# Patient Record
Sex: MALE | Race: WHITE | HISPANIC OR LATINO | Employment: OTHER | URBAN - METROPOLITAN AREA
[De-identification: names, ages, dates, MRNs, and addresses within clinical notes are randomized per-mention and may not be internally consistent; named-entity substitution may affect disease eponyms.]

---

## 2017-09-20 NOTE — PATIENT DISCUSSION
The patient has occludable angles. Laser peripheral iridotomy is recommended. The risks, benefits, and alternatives to surgery were discussed. The patient elects to proceed with surgery.

## 2017-09-20 NOTE — PATIENT DISCUSSION
Advised patient to avoid certain medications such as antihistamine/decongestant drops, cold medications and to read labels on over the counter and prescription medications in which it may cause an acute angle-closure glaucoma attack until procedures are done in both eyes.

## 2018-03-14 NOTE — PATIENT DISCUSSION
Discussed importance of taking drops consistently. Cost is an issue with branded medication. $40 vs $4. Patient being affected by Dorz/Too shortage. Running out of medication options. Was not tolerating Combigan. Needs to stay on Dorz/Too.

## 2018-08-01 ENCOUNTER — PREPPED CHART (OUTPATIENT)
Dept: URBAN - METROPOLITAN AREA CLINIC 25 | Facility: CLINIC | Age: 33
End: 2018-08-01

## 2021-01-11 NOTE — PATIENT DISCUSSION
DISCUSSED WITH PT THAT IT HAS BEEN REPORTED THAT PATIENTS WITH HISTORY OF DEPRESSION OR MENTAL ILLNESS FOLLOWING ELECTIVE PROCEDURES, SUCH AS PLASTIC SURGERY OR LASER VISION CORRECTION CAN ADVERSELY EFFECT THEIR QUALITY OF LIFE, REGARDLESS WHETHER OUTCOME IS GOOD OR SUB-OPTIMAL PT ADVISED OF THE ALTERNATIVES OF STAYING WITH GLASSES AND/OR CONTACTS IN LIEU OF LASER VISION CORRECTION. PT. UNDERSTANDS THE RISK AND WISH TO PROCEED WITH PROCEDURE.

## 2021-01-11 NOTE — PATIENT DISCUSSION
New Prescription: gatifloxacin (gatifloxacin): drops: 0.5% 1 drop four times a day into both eyes 01-

## 2021-01-11 NOTE — PATIENT DISCUSSION
New Prescription: prednisolone acetate (prednisolone acetate): drops,suspension: 1% 1 drop four times a day into both eyes 01-

## 2021-01-28 NOTE — PATIENT DISCUSSION
Continue: prednisolone acetate (prednisolone acetate): drops,suspension: 1% 1 drop four times a day into both eyes 01-

## 2021-01-28 NOTE — PATIENT DISCUSSION
MYOPIA, OU- DISC OPT OF REFRACTIVE [id number]. PT UNDERSTANDS OPTIONS AND DESIRES TO PROCEED WITH LASIK TO REDUCE DEPENDENCY ON GLS/CTLS.

## 2021-10-28 ASSESSMENT — TONOMETRY
OD_IOP_MMHG: 9
OS_IOP_MMHG: 10

## 2021-11-01 ENCOUNTER — NEW PATIENT COMPREHENSIVE (OUTPATIENT)
Dept: URBAN - METROPOLITAN AREA CLINIC 25 | Facility: CLINIC | Age: 36
End: 2021-11-01

## 2021-11-01 DIAGNOSIS — H52.13: ICD-10-CM

## 2021-11-01 DIAGNOSIS — H52.223: ICD-10-CM

## 2021-11-01 DIAGNOSIS — H18.603: ICD-10-CM

## 2021-11-01 PROCEDURE — 92004 COMPRE OPH EXAM NEW PT 1/>: CPT

## 2021-11-01 PROCEDURE — 92015 DETERMINE REFRACTIVE STATE: CPT

## 2021-11-01 PROCEDURE — 92025 CPTRIZED CORNEAL TOPOGRAPHY: CPT

## 2021-11-01 ASSESSMENT — KERATOMETRY
OS_AXISANGLE_DEGREES: 167
OS_AXISANGLE2_DEGREES: 77
OD_K2POWER_DIOPTERS: 45.75
OS_K1POWER_DIOPTERS: 42.50
OD_K1POWER_DIOPTERS: 43.75
OD_AXISANGLE2_DEGREES: 104
OS_K2POWER_DIOPTERS: 44.00
OD_AXISANGLE_DEGREES: 14

## 2021-11-01 ASSESSMENT — VISUAL ACUITY
OS_SC: 20/20
OD_SC: 20/40

## 2021-11-01 ASSESSMENT — TONOMETRY
OS_IOP_MMHG: 10
OD_IOP_MMHG: 8

## 2022-07-26 ENCOUNTER — PREPPED CHART (OUTPATIENT)
Dept: URBAN - METROPOLITAN AREA CLINIC 29 | Facility: CLINIC | Age: 37
End: 2022-07-26

## 2022-07-26 ASSESSMENT — KERATOMETRY
OS_AXISANGLE2_DEGREES: 77
OS_AXISANGLE_DEGREES: 167
OS_K2POWER_DIOPTERS: 44.00
OD_AXISANGLE2_DEGREES: 104
OD_AXISANGLE_DEGREES: 14
OD_K2POWER_DIOPTERS: 45.75
OS_K1POWER_DIOPTERS: 42.50
OD_K1POWER_DIOPTERS: 43.75

## 2022-07-29 ENCOUNTER — CONSULTATION/EVALUATION (OUTPATIENT)
Dept: URBAN - METROPOLITAN AREA CLINIC 29 | Facility: CLINIC | Age: 37
End: 2022-07-29

## 2022-07-29 DIAGNOSIS — H52.13: ICD-10-CM

## 2022-07-29 DIAGNOSIS — H18.629: ICD-10-CM

## 2022-07-29 PROCEDURE — 92025 CPTRIZED CORNEAL TOPOGRAPHY: CPT

## 2022-07-29 PROCEDURE — 99203 OFFICE O/P NEW LOW 30 MIN: CPT

## 2022-07-29 ASSESSMENT — KERATOMETRY
OD_K1POWER_DIOPTERS: 43.75
OS_K1POWER_DIOPTERS: 42.50
OS_AXISANGLE2_DEGREES: 77
OS_K2POWER_DIOPTERS: 44.00
OS_AXISANGLE_DEGREES: 167
OD_AXISANGLE2_DEGREES: 104
OD_AXISANGLE_DEGREES: 14
OD_K2POWER_DIOPTERS: 45.75

## 2022-07-29 ASSESSMENT — VISUAL ACUITY
OD_CC: 20/25-2
OS_SC: 20/25+2
OS_CC: 20/20
OD_PH: 20/20-2
OD_SC: 20/40-2

## 2022-07-29 ASSESSMENT — TONOMETRY
OS_IOP_MMHG: 13
OD_IOP_MMHG: 12

## 2022-12-07 ENCOUNTER — CLINIC PROCEDURE ONLY (OUTPATIENT)
Dept: URBAN - METROPOLITAN AREA CLINIC 29 | Facility: CLINIC | Age: 37
End: 2022-12-07

## 2022-12-07 VITALS — HEIGHT: 60 IN | SYSTOLIC BLOOD PRESSURE: 140 MMHG | DIASTOLIC BLOOD PRESSURE: 80 MMHG

## 2022-12-07 PROCEDURE — 0402T COLGN CRS-LINK CRN&PACHYMTRY: CPT

## 2022-12-07 PROCEDURE — 92071 CONTACT LENS FITTING FOR TX: CPT

## 2022-12-07 RX ORDER — BROMFENAC SODIUM 0.7 MG/ML: 1 SOLUTION/ DROPS OPHTHALMIC ONCE A DAY

## 2022-12-07 ASSESSMENT — KERATOMETRY
OS_K1POWER_DIOPTERS: 42.50
OD_K2POWER_DIOPTERS: 45.75
OS_AXISANGLE2_DEGREES: 77
OD_K1POWER_DIOPTERS: 43.75
OS_K2POWER_DIOPTERS: 44.00
OD_AXISANGLE2_DEGREES: 104
OS_AXISANGLE_DEGREES: 167
OD_AXISANGLE_DEGREES: 14

## 2022-12-12 ENCOUNTER — POST-OP (OUTPATIENT)
Dept: URBAN - METROPOLITAN AREA CLINIC 29 | Facility: CLINIC | Age: 37
End: 2022-12-12

## 2022-12-12 PROCEDURE — 66999PO NON CO-MANAGED OTHER SURGERY PO

## 2022-12-12 ASSESSMENT — KERATOMETRY
OD_AXISANGLE_DEGREES: 14
OD_K1POWER_DIOPTERS: 43.75
OS_AXISANGLE_DEGREES: 167
OS_K1POWER_DIOPTERS: 42.50
OS_K2POWER_DIOPTERS: 44.00
OD_K2POWER_DIOPTERS: 45.75
OS_AXISANGLE2_DEGREES: 77
OD_AXISANGLE2_DEGREES: 104

## 2022-12-12 ASSESSMENT — VISUAL ACUITY: OS_SC: 20/25-2

## 2023-01-10 ENCOUNTER — POST-OP (OUTPATIENT)
Dept: URBAN - METROPOLITAN AREA CLINIC 29 | Facility: CLINIC | Age: 38
End: 2023-01-10

## 2023-01-10 DIAGNOSIS — Z98.890: ICD-10-CM

## 2023-01-10 DIAGNOSIS — H18.622: ICD-10-CM

## 2023-01-10 PROCEDURE — 92025 CPTRIZED CORNEAL TOPOGRAPHY: CPT

## 2023-01-10 PROCEDURE — 66999PO NON CO-MANAGED OTHER SURGERY PO

## 2023-01-10 ASSESSMENT — KERATOMETRY
OS_AXISANGLE_DEGREES: 167
OD_K2POWER_DIOPTERS: 45.75
OD_AXISANGLE_DEGREES: 14
OS_K2POWER_DIOPTERS: 44.00
OS_K1POWER_DIOPTERS: 42.50
OD_AXISANGLE2_DEGREES: 104
OS_AXISANGLE2_DEGREES: 77
OD_K1POWER_DIOPTERS: 43.75

## 2023-01-10 ASSESSMENT — TONOMETRY
OS_IOP_MMHG: 12
OD_IOP_MMHG: 13

## 2023-01-10 ASSESSMENT — VISUAL ACUITY
OD_SC: 20/40-2
OS_SC: 20/25

## 2023-04-28 ENCOUNTER — POST-OP (OUTPATIENT)
Dept: URBAN - METROPOLITAN AREA CLINIC 29 | Facility: CLINIC | Age: 38
End: 2023-04-28

## 2023-04-28 DIAGNOSIS — H18.613: ICD-10-CM

## 2023-04-28 PROCEDURE — 99213 OFFICE O/P EST LOW 20 MIN: CPT

## 2023-04-28 PROCEDURE — 92025 CPTRIZED CORNEAL TOPOGRAPHY: CPT

## 2023-04-28 ASSESSMENT — KERATOMETRY
OS_AXISANGLE_DEGREES: 167
OD_AXISANGLE2_DEGREES: 104
OS_K1POWER_DIOPTERS: 42.50
OS_AXISANGLE2_DEGREES: 77
OD_AXISANGLE_DEGREES: 14
OD_K2POWER_DIOPTERS: 45.75
OS_K2POWER_DIOPTERS: 44.00
OD_K1POWER_DIOPTERS: 43.75

## 2023-04-28 ASSESSMENT — VISUAL ACUITY
OS_SC: 20/20
OD_SC: 20/30+2

## 2023-04-28 ASSESSMENT — TONOMETRY
OS_IOP_MMHG: 13
OD_IOP_MMHG: 12

## 2023-10-20 ENCOUNTER — FOLLOW UP (OUTPATIENT)
Dept: URBAN - METROPOLITAN AREA CLINIC 29 | Facility: CLINIC | Age: 38
End: 2023-10-20

## 2023-10-20 DIAGNOSIS — H18.613: ICD-10-CM

## 2023-10-20 DIAGNOSIS — Z98.890: ICD-10-CM

## 2023-10-20 PROCEDURE — 92012 INTRM OPH EXAM EST PATIENT: CPT

## 2023-10-20 PROCEDURE — 92025 CPTRIZED CORNEAL TOPOGRAPHY: CPT

## 2023-10-20 ASSESSMENT — KERATOMETRY
OD_K1POWER_DIOPTERS: 43.75
OD_AXISANGLE2_DEGREES: 104
OS_AXISANGLE2_DEGREES: 77
OS_K1POWER_DIOPTERS: 42.50
OD_AXISANGLE_DEGREES: 14
OS_K2POWER_DIOPTERS: 44.00
OD_K2POWER_DIOPTERS: 45.75
OS_AXISANGLE_DEGREES: 167

## 2023-10-20 ASSESSMENT — VISUAL ACUITY
OD_SC: 20/30-2
OS_SC: 20/20-2

## 2023-10-20 ASSESSMENT — TONOMETRY
OD_IOP_MMHG: 12
OS_IOP_MMHG: 12

## 2024-08-16 ENCOUNTER — COMPREHENSIVE EXAM (OUTPATIENT)
Dept: URBAN - METROPOLITAN AREA CLINIC 25 | Facility: CLINIC | Age: 39
End: 2024-08-16

## 2024-08-16 DIAGNOSIS — H52.223: ICD-10-CM

## 2024-08-16 PROCEDURE — 92014 COMPRE OPH EXAM EST PT 1/>: CPT

## 2024-08-16 PROCEDURE — 92015 DETERMINE REFRACTIVE STATE: CPT

## 2024-08-16 ASSESSMENT — VISUAL ACUITY
OD_SC: 20/20-1
OS_SC: 20/20

## 2024-08-16 ASSESSMENT — KERATOMETRY
OS_AXISANGLE_DEGREES: 168
OS_K2POWER_DIOPTERS: 44.00
OD_AXISANGLE_DEGREES: 14
OD_K1POWER_DIOPTERS: 43.75
OD_AXISANGLE2_DEGREES: 104
OD_K1POWER_DIOPTERS: 43.50
OS_AXISANGLE2_DEGREES: 77
OD_K2POWER_DIOPTERS: 45.75
OS_AXISANGLE_DEGREES: 167
OS_K1POWER_DIOPTERS: 42.50
OD_K2POWER_DIOPTERS: 45.50
OS_AXISANGLE2_DEGREES: 78
OD_AXISANGLE_DEGREES: 12
OD_AXISANGLE2_DEGREES: 102

## 2024-08-16 ASSESSMENT — TONOMETRY
OS_IOP_MMHG: 11
OD_IOP_MMHG: 8